# Patient Record
Sex: MALE
[De-identification: names, ages, dates, MRNs, and addresses within clinical notes are randomized per-mention and may not be internally consistent; named-entity substitution may affect disease eponyms.]

---

## 2020-05-25 ENCOUNTER — HOSPITAL ENCOUNTER (EMERGENCY)
Dept: HOSPITAL 88 - ER | Age: 40
Discharge: HOME | End: 2020-05-25
Payer: COMMERCIAL

## 2020-05-25 VITALS — BODY MASS INDEX: 28.32 KG/M2 | WEIGHT: 170 LBS | HEIGHT: 65 IN

## 2020-05-25 DIAGNOSIS — N20.1: ICD-10-CM

## 2020-05-25 DIAGNOSIS — M54.5: Primary | ICD-10-CM

## 2020-05-25 LAB
ALBUMIN SERPL-MCNC: 4.6 G/DL (ref 3.5–5)
ALBUMIN/GLOB SERPL: 1.3 {RATIO} (ref 0.8–2)
ALP SERPL-CCNC: 74 IU/L (ref 40–150)
ALT SERPL-CCNC: 40 IU/L (ref 0–55)
ANION GAP SERPL CALC-SCNC: 16.6 MMOL/L (ref 8–16)
BACTERIA URNS QL MICRO: (no result) /HPF
BASOPHILS # BLD AUTO: 0 10*3/UL (ref 0–0.1)
BASOPHILS NFR BLD AUTO: 0.3 % (ref 0–1)
BILIRUB UR QL: NEGATIVE
BUN SERPL-MCNC: 14 MG/DL (ref 7–26)
BUN/CREAT SERPL: 11 (ref 6–25)
CALCIUM SERPL-MCNC: 9.3 MG/DL (ref 8.4–10.2)
CHLORIDE SERPL-SCNC: 106 MMOL/L (ref 98–107)
CLARITY UR: (no result)
CO2 SERPL-SCNC: 24 MMOL/L (ref 22–29)
COLOR UR: YELLOW
DEPRECATED NEUTROPHILS # BLD AUTO: 11.8 10*3/UL (ref 2.1–6.9)
EGFRCR SERPLBLD CKD-EPI 2021: > 60 ML/MIN (ref 60–?)
EOSINOPHIL # BLD AUTO: 0 10*3/UL (ref 0–0.4)
EOSINOPHIL NFR BLD AUTO: 0.1 % (ref 0–6)
EPI CELLS URNS QL MICRO: (no result) /LPF
ERYTHROCYTE [DISTWIDTH] IN CORD BLOOD: 12.8 % (ref 11.7–14.4)
GLOBULIN PLAS-MCNC: 3.6 G/DL (ref 2.3–3.5)
GLUCOSE SERPLBLD-MCNC: 123 MG/DL (ref 74–118)
HCT VFR BLD AUTO: 43.7 % (ref 38.2–49.6)
HGB BLD-MCNC: 15.2 G/DL (ref 14–18)
KETONES UR QL STRIP.AUTO: (no result)
LEUKOCYTE ESTERASE UR QL STRIP.AUTO: NEGATIVE
LYMPHOCYTES # BLD: 1.3 10*3/UL (ref 1–3.2)
LYMPHOCYTES NFR BLD AUTO: 9.3 % (ref 18–39.1)
MCH RBC QN AUTO: 29.9 PG (ref 28–32)
MCHC RBC AUTO-ENTMCNC: 34.8 G/DL (ref 31–35)
MCV RBC AUTO: 86 FL (ref 81–99)
MONOCYTES # BLD AUTO: 0.4 10*3/UL (ref 0.2–0.8)
MONOCYTES NFR BLD AUTO: 2.7 % (ref 4.4–11.3)
NEUTS SEG NFR BLD AUTO: 87.2 % (ref 38.7–80)
NITRITE UR QL STRIP.AUTO: NEGATIVE
PLATELET # BLD AUTO: 234 X10E3/UL (ref 140–360)
POTASSIUM SERPL-SCNC: 4.6 MMOL/L (ref 3.5–5.1)
PROT UR QL STRIP.AUTO: (no result)
RBC # BLD AUTO: 5.08 X10E6/UL (ref 4.3–5.7)
SODIUM SERPL-SCNC: 142 MMOL/L (ref 136–145)
SP GR UR STRIP: 1.03 (ref 1.01–1.02)
UROBILINOGEN UR STRIP-MCNC: 0.2 MG/DL (ref 0.2–1)
WBC #/AREA URNS HPF: (no result) /HPF (ref 0–5)

## 2020-05-25 PROCEDURE — 85025 COMPLETE CBC W/AUTO DIFF WBC: CPT

## 2020-05-25 PROCEDURE — 99284 EMERGENCY DEPT VISIT MOD MDM: CPT

## 2020-05-25 PROCEDURE — 80053 COMPREHEN METABOLIC PANEL: CPT

## 2020-05-25 PROCEDURE — 87086 URINE CULTURE/COLONY COUNT: CPT

## 2020-05-25 PROCEDURE — 81001 URINALYSIS AUTO W/SCOPE: CPT

## 2020-05-25 PROCEDURE — 74176 CT ABD & PELVIS W/O CONTRAST: CPT

## 2020-05-25 PROCEDURE — 36415 COLL VENOUS BLD VENIPUNCTURE: CPT

## 2020-05-25 NOTE — EMERGENCY DEPARTMENT NOTE
History of Present Illnes


History of Present Illness


Chief Complaint:  Genitourinary


History of Present Illness


This is a 39 year old  male  .


Historian:  Patient


Arrival Mode:  Car


Onset (how long ago):  day(s) (today)


Location:  right flank pain


Quality:  moderate


Radiation:  back, abdomen, flank


Severity:  moderate


Onset quality:  sudden


Duration (how long):  day(s) (today)


Progression:  worsening


Context:  recent illness, recent surgery, recent immobilization, recent travel, 

trauma/injury, new medications, hx of DVT/PE, non-compliance w/ medications, 

other


Relieving factors:  none


Exacerbating factors:  none


Associated symptoms:  other


Treatments prior to arrival:  none





Past Medical/Family History


Physician Review


I have reviewed the patient's past medical and family history.  Any updates have

been documented here.





Past Medical History


Recent Fever:  No


Clinical Suspicion of Infectio:  No


New/Unexplained Change in Ment:  No


Past Medical History:  None


Past Surgical History:  None





Social History


Smoking Cessation:  Never Smoker


Alcohol Use:  None


Any Illegal Drug Use:  No


TB Exposure/Symptoms:  No





Family History


Family history of heart diseas:  No





Other


Any Pre-Existing Lines (PICC,:  No





Review of Systems


Review of Systems


Constitutional:  no symptoms


EENTM:  no symptoms


Cardiovascular:  no symptoms


Respiratory:  no symptoms


Gastrointestinal:  other (r flank pain )


Genitourinary:  no symptoms


Musculoskeletal:  back pain


Neurological:  no symptoms


Psychological:  no symptoms, as per HPI, anxiety, depressed, emotional problems,

other


Endocrine:  no symptoms, as per HPI, excessive sweating, flushing, intolerance 

to cold, intolerance to heat, increased hunger, increased thirst, increased 

urination, unexplained weight gain, unexplained weight loss, other


Hematological/Lymphatic:  no symptoms


Review of other systems


All other systems reviewed and negative.





Physical Exam


Related Data


Allergies:  


Coded Allergies:  


     No Known Allergies (Unverified , 5/25/20)


Triage Vital Signs





Vital Signs








  Date Time  Temp Pulse Resp B/P (MAP) Pulse Ox O2 Delivery O2 Flow Rate FiO2


 


5/25/20 13:32 97.8 55 16 182/99 99   








Vital signs reviewed:  Yes





Physical Exam


CONSTITUTIONAL





Constitutional:  well-developed, well-nourished


HENT


HENT:  normocephalic, atraumatic, oropharynx clear/moist, nose normal


HENT L/R:  left ext ear normal, right ext ear normal


EYES





Eyes:  PERRL, conjunctivae normal


NECK


Neck:  ROM normal


PULMONARY


Pulmonary:  effort normal, breath sounds normal


CARDIOVASCULAR





GASTROINTESTINAL





Abdominal:  soft, nontender, bowel sounds normal, right CVA tenderness, other 

(c/o r flank pain )


GENITOURINARY





Genitourinary:  exam deferred


SKIN


Skin:  warm, dry


MUSCULOSKELETAL





Musculoskeletal:  ROM normal


NEUROLOGICAL





Neurological:  alert, oriented x 3, no gross motor or sensory deficits


PSYCHOLOGICAL


Psychological:  mood/affect normal, judgement normal


Exam - additional comments


exam and presentation c/w poss kidney stone





Results


Laboratory


Laboratory





Laboratory Tests








Test


 5/25/20


15:05 5/25/20


13:40


 


Urine Color


 Yellow


(YELLOW) 





 


Urine Clarity Hazy (CLEAR)  


 


Urine pH 7 (5 - 7)  


 


Urine Specific Gravity


 1.030


(1.010-1.025) 





 


Urine Protein 1+ (NEGATIVE)  


 


Urine Glucose (UA)


 Negative


(NEGATIVE) 





 


Urine Ketones 1+ (NEGATIVE)  


 


Urine Blood 4+ (NEGATIVE)  


 


Urine Nitrite


 Negative


(NEGATIVE) 





 


Urine Bilirubin


 Negative


(NEGATIVE) 





 


Urine Urobilinogen


 0.2 mg/dL (0.2


- 1) 





 


Urine Leukocyte Esterase


 Negative


(NEGATIVE) 





 


Urine RBC


 11-20 /HPF


(0-5) 





 


Urine WBC


 None /HPF


(0-5) 





 


Urine Epithelial Cells


 None /LPF


(NONE) 





 


Urine Amorphous Sediment Moderate (FEW)  


 


Urine Bacteria


 Few /HPF


(NONE) 





 


White Blood Count


 


 13.49 x10e3/uL


(4.8-10.8)


 


Red Blood Count


 


 5.08 x10e6/uL


(4.3-5.7)


 


Hemoglobin


 


 15.2 g/dL


(14.0-18.0)


 


Hematocrit


 


 43.7 %


(38.2-49.6)


 


Mean Corpuscular Volume


 


 86.0 fL


(81-99)


 


Mean Corpuscular Hemoglobin


 


 29.9 pg


(28-32)


 


Mean Corpuscular Hemoglobin


Concent 


 34.8 g/dL


(31-35)


 


Red Cell Distribution Width


 


 12.8 %


(11.7-14.4)


 


Platelet Count


 


 234 x10e3/uL


(140-360)


 


Neutrophils (%) (Auto)


 


 87.2 %


(38.7-80.0)


 


Lymphocytes (%) (Auto)


 


 9.3 %


(18.0-39.1)


 


Monocytes (%) (Auto)


 


 2.7  %


(4.4-11.3)


 


Eosinophils (%) (Auto)


 


 0.1 %


(0.0-6.0)


 


Basophils (%) (Auto)


 


 0.3 %


(0.0-1.0)


 


Neutrophils # (Auto)  11.8 (2.1-6.9) 


 


Lymphocytes # (Auto)  1.3 (1.0-3.2) 


 


Monocytes # (Auto)  0.4 (0.2-0.8) 


 


Eosinophils # (Auto)  0.0 (0.0-0.4) 


 


Basophils # (Auto)  0.0 (0.0-0.1) 


 


Absolute Immature Granulocyte


(auto 


 0.06 x10e3/uL


(0-0.1)


 


Sodium Level


 


 142 mmol/L


(136-145)


 


Potassium Level


 


 4.6 mmol/L


(3.5-5.1)


 


Chloride Level


 


 106 mmol/L


()


 


Carbon Dioxide Level


 


 24 mmol/L


(22-29)


 


Anion Gap


 


 16.6 mmol/L


(8-16)


 


Blood Urea Nitrogen


 


 14 mg/dL


(7-26)


 


Creatinine


 


 1.28 mg/dL


(0.72-1.25)


 


Estimat Glomerular Filtration


Rate 


 > 60 ML/MIN


(60-)


 


BUN/Creatinine Ratio  11 (6-25) 


 


Glucose Level


 


 123 mg/dL


()


 


Calcium Level


 


 9.3 mg/dL


(8.4-10.2)


 


Total Bilirubin


 


 1.0 mg/dL


(0.2-1.2)


 


Aspartate Amino Transf


(AST/SGOT) 


 27 IU/L (5-34) 





 


Alanine Aminotransferase


(ALT/SGPT) 


 40 IU/L (0-55) 





 


Alkaline Phosphatase


 


 74 IU/L


()


 


Total Protein


 


 8.2 g/dL


(6.5-8.1)


 


Albumin


 


 4.6 g/dL


(3.5-5.0)


 


Globulin


 


 3.6 g/dL


(2.3-3.5)


 


Albumin/Globulin Ratio  1.3 (0.8-2.0) 








Lab results reviewed:  Yes





Imaging


Impressions


IMPRESSION: 





1.4.5 mm obstructing calculus at the right distal ureter/UVJ (series 3, image


148), results in mild right hydroureter, mild proximal periureteral stranding


and moderate right hydronephrosis.





2. 2 punctate nonobstructing calculi in the left inferior pole and a punctate


nonobstructing calculus in the left interpolar region. No other renal or any


left ureteral calculi, left hydronephrosis or obstruction





Signed by: Dr. Rio Hurley M.D. on 5/25/2020 2:33 PM








Dictated By: RIO HURLEY MD


Electronically Signed By: RIO HURLEY MD on 05/25/20 1433


Transcribed By: STEPHANIA on 05/25/20 1433





Critical Care Time


Subsequent provider


I assumed direction of critical care for this patient from another provider of 

my specialty.





Assessment & Plan


Reassessment


Reassessment time:  13:53


Reassessment


39y m presented to ed c/o r flank pain on set today discusse dplan of care w/ Dr Santana - lab rad ordered pt medicated for pain and given Rocephin





Assessment & Plan


Final Impression:  


(1) Flank pain, acute


(2) Ureterolithiasis


Assessment & Plan


discussed plan of care and f/u instructions w/ uro in 1-2 days w/o fail or 

return to ed as needed pt agreed





 pt pain free at this time 














d/c plan: 


1. f/u w/ uro in 1-2 days w/o fail


2. tylenol and motrin as needed


3. increase oral fluids


4. flomax t#3 zofran


Depart Disposition:  HOME, SELF-CARE


Last Vital Signs











  Date Time  Temp Pulse Resp B/P (MAP) Pulse Ox O2 Delivery O2 Flow Rate FiO2


 


5/25/20 13:32 97.8 55 16 182/99 99   








Medications in the ED





Sodium Chloride 1,000 ml @  0 mls/hr Q0M STAT IV ;  Start 5/25/20 at 13:28;  

Stop 5/25/20 at 13:29


Ceftriaxone Sodium 50 ml @ 50 mls/hr ONCE  ONCE IV ;  Start 5/25/20 at 13:28;  

Stop 5/25/20 at 14:27


Morphine Sulfate 4 mg ONCE  ONCE IV ;  Start 5/25/20 at 13:28;  Stop 5/25/20 at 

13:29


Ondansetron HCl 4 mg ONCE  ONCE IV ;  Start 5/25/20 at 13:28;  Stop 5/25/20 at 

13:29


Ketorolac Tromethamine 30 mg ONCE  ONCE IV ;  Start 5/25/20 at 13:28;  Stop 

5/25/20 at 13:29











FOREIGN VILLASENOR NP             May 25, 2020 13:55

## 2020-05-25 NOTE — DIAGNOSTIC IMAGING REPORT
EXAMINATION: CT of the abdomen and pelvis without contrast.

 

TECHNIQUE: Spiral CT images of the abdomen and pelvis were performed from the

lung bases to the lesser trochanters.  No intravenous contrast was given per

renal stone protocol.  Coronal and sagittal reformatted images were obtained.



COMPARISON:  None.



CLINICAL HISTORY:Right-sided flank pain and vomiting which started today

     

DISCUSSION: ABSENCE OF INTRAVENOUS CONTRAST DECREASES SENSITIVITY FOR DETECTION

OF FOCAL LESIONS AND VASCULAR PATHOLOGY.



ABDOMEN/PELVIS:



LOWER THORAX: Unremarkable. 



HEPATOBILIARY: No focal hepatic lesions.  No intra or extrahepatic biliary

ductal dilation.



GALLBLADDER: No radio-opaque stones or sludge.  No wall thickening.



SPLEEN: No splenomegaly.



PANCREAS: No focal masses or ductal dilatation.



ADRENALS: No adrenal nodules.



KIDNEYS/URETERS: 4.5 mm obstructing calculus at the right distal ureter/UVJ

(series 3, image 148), results in mild right hydroureter, mild proximal

periureteral stranding and moderate right hydronephrosis.

2 punctate nonobstructing calculi in the left inferior pole (series 3, image

78).

Punctate nonobstructing calculus in the left interpolar region (series 3, image

65).

No other renal or ureteral calculi, no left hydronephrosis or obstruction.

No contour abnormalities. 



PELVIC ORGANS/BLADDER: Bladder is unremarkable, without focal lesions or wall

thickening. Prostate shows mildly dystrophic calcifications.



PERITONEUM/RETROPERITONEUM: No free air or fluid.



LYMPH NODES: No intra-abdominal,retroperitoneal, pelvic or inguinal

lymphadenopathy.



VESSELS: Unremarkable for noncontrast exam.



GI TRACT: No bowel dilation or evidence of obstruction. Appendix is well

identified and normal in caliber.



BONES AND SOFT TISSUES: No aggressive lytic or suspicious sclerotic lesion.

Soft tissues are grossly unremarkable.



IMPRESSION: 



1.4.5 mm obstructing calculus at the right distal ureter/UVJ (series 3, image

148), results in mild right hydroureter, mild proximal periureteral stranding

and moderate right hydronephrosis.



2. 2 punctate nonobstructing calculi in the left inferior pole and a punctate

nonobstructing calculus in the left interpolar region. No other renal or any

left ureteral calculi, left hydronephrosis or obstruction



Signed by: Dr. Deuce Davis M.D. on 5/25/2020 2:33 PM

## 2020-12-28 ENCOUNTER — HOSPITAL ENCOUNTER (EMERGENCY)
Dept: HOSPITAL 88 - FSED | Age: 40
Discharge: HOME | End: 2020-12-28
Payer: COMMERCIAL

## 2020-12-28 VITALS — WEIGHT: 170 LBS | HEIGHT: 65 IN | BODY MASS INDEX: 28.32 KG/M2

## 2020-12-28 DIAGNOSIS — N20.1: Primary | ICD-10-CM

## 2020-12-28 PROCEDURE — 85025 COMPLETE CBC W/AUTO DIFF WBC: CPT

## 2020-12-28 PROCEDURE — 99284 EMERGENCY DEPT VISIT MOD MDM: CPT

## 2020-12-28 PROCEDURE — 74176 CT ABD & PELVIS W/O CONTRAST: CPT

## 2020-12-28 PROCEDURE — 81003 URINALYSIS AUTO W/O SCOPE: CPT

## 2020-12-28 PROCEDURE — 80053 COMPREHEN METABOLIC PANEL: CPT
